# Patient Record
Sex: FEMALE | Race: WHITE | NOT HISPANIC OR LATINO | Employment: UNEMPLOYED | URBAN - METROPOLITAN AREA
[De-identification: names, ages, dates, MRNs, and addresses within clinical notes are randomized per-mention and may not be internally consistent; named-entity substitution may affect disease eponyms.]

---

## 2017-12-28 ENCOUNTER — OFFICE VISIT (OUTPATIENT)
Dept: URGENT CARE | Age: 47
End: 2017-12-28
Payer: COMMERCIAL

## 2017-12-28 PROCEDURE — G0382 LEV 3 HOSP TYPE B ED VISIT: HCPCS | Performed by: FAMILY MEDICINE

## 2017-12-30 NOTE — PROGRESS NOTES
Assessment   1  Viral upper respiratory tract infection with cough (465 9) (J06 9,B97 89)    Plan   Viral upper respiratory tract infection with cough    · Promethazine-DM 6 25-15 MG/5ML Oral Syrup; TAKE 5 ML EVERY 4 TO 6 HOURS    AS NEEDED FOR COUGH    Discussion/Summary   Discussion Summary: This does to appear to be residual influenza  Symptomatic treatment recommended  Push fluids  Rest  Vaporizer in bedroom  Decrease smoking (consider quitting)  Prescription cough medicine as directed  fevers typically last anywhere from 5-7 days and then slowly resolve  Cough and nasal drainage typically max at day 10 and then slowly improved over the next couple weeks  Provider likes the Mucinex D products for daytime use  Typical uses 1/2 to 1 tablet twice a day with full glass of fluid  Then may use cough medicine at bedtime  Chief Complaint   1  Cold Symptoms   2  Fever  Chief Complaint Free Text Note Form: Since 12/22/17 - sore throat, nasal congestion /rhinorrhea and PND, R ear pain dry cough and fever at HS (100 8 max on 12/25/17)  Motrin, DayQuil and Tussin DM  Flu vaccine  History of Present Illness   HPI: 55 yo female with sore throat nasal congestion cough fever chills fatigue malaise  Started on December 22nd  She has also had some right ear pain  She has been doing Motrin as well as DayQuil and Tussin p [facility] Cough is worse at night  Spent Christmas day on the couch  2 sons with similar illness  Hospital Based Practices Required Assessment:      Pain Assessment      the patient states they have pain  The pain is located in the R ear, cough, sinuses and sore throat  (on a scale of 0 to 10, the patient rates the pain at 6 )      Abuse And Domestic Violence Screen       Yes, the patient is safe at home  -- The patient states no one is hurting them  Depression And Suicide Screen  No, the patient has not had thoughts of hurting themself  No, the patient has not felt depressed in the past 7 days  Prefered Language is  [place]  Primary Language is  English  Review of Systems   Focused-Female:      Constitutional: as noted in HPI       ENT: as noted in HPI  Cardiovascular: no complaints of slow or fast heart rate, no chest pain, no palpitations, no leg claudication or lower extremity edema  Respiratory: cough, but-- as noted in HPI  Musculoskeletal: myalgias-- and-- Muscle aches are improving  Integumentary: no complaints of skin rash or lesion, no itching or dry skin, no skin wounds  Neurological: no complaints of headache, no confusion, no numbness or tingling, no dizziness or fainting  Active Problems   1  Cervical strain (847 0) (S16 1XXA)   2  Lumbar strain (847 2) (S39 012A)   3  Neck pain (723 1) (M54 2)   4  Sacroiliac joint dysfunction of right side (724 6) (M53 3)   5  Sciatica (724 3) (M54 30)    Past Medical History   1  History of Back pain (724 5) (M54 9)   2  History of Degenerative disc disease (722 6)  Active Problems And Past Medical History Reviewed: The active problems and past medical history were reviewed and updated today  Family History   Family History Reviewed: The family history was reviewed and updated today  Social History    · Current every day smoker (305 1) (F17 200)   · No alcohol use   · Denied: History of No drug use  Social History Reviewed: The social history was reviewed and updated today  Surgical History   1  History of Cervical Vertebral Fusion   2  History of Oral Surgery Tooth Extraction [place] Tooth   3  History of Tonsillectomy With Adenoidectomy  Surgical History Reviewed: The surgical history was reviewed and updated today  Current Meds    1  Motrin TABS; Therapy: (Recorded:[id number]) to Recorded  Medication List Reviewed: The medication list was reviewed and updated today  Allergies   1  Naprosyn TABS   2   Penicillins    Vitals   Signs   Recorded: [id number] 07:49PM Temperature: 98 3 F, Oral  Heart Rate: 71  Pulse Quality: Regular  Respiration: 18  Systolic: 044, RUE, Sitting  Diastolic: 60, RUE, Sitting  Height: 5 ft 1 25 in  Weight: 122 lb 6 4 oz  BMI Calculated: 22 94  BSA Calculated: 1 54  O2 Saturation: 96  LMP: [id number]  Pain Scale: 6    Physical Exam        Constitutional well-developed well-nourished female in no acute distress but does appear mildly ill  Eyes      Conjunctiva and lids: No swelling, erythema or discharge  Pupils and irises: Equal, round and reactive to light  Ears, Nose, Mouth, and Throat      External inspection of ears and nose: Normal        Otoscopic examination: Abnormal  -- slight retraction right TM without redness or fluid  Nasal mucosa, septum, and turbinates: Abnormal  -- nasal turbinates red and swollen  Oropharynx: Abnormal  -- cobblestoning and patchy redness of the posterior pharynx  Pulmonary      Respiratory effort: No increased work of breathing or signs of respiratory distress  Auscultation of lungs: Clear to auscultation  Cardiovascular      Palpation of heart: Normal PMI, no thrills  Auscultation of heart: Normal rate and rhythm, normal S1 and S2, without murmurs  Lymphatic      Palpation of lymph nodes in neck: No lymphadenopathy  Psychiatric      Orientation to person, place, and time: Normal        Mood and affect: Normal        Signatures    Electronically signed by :  Jason Dunlap [facility]; Dec 28 2017  8:38PM EST                       (Author)     Electronically signed by : Love Puentes DO; Dec 29 2017  7:15AM EST                       (Co-author)

## 2018-01-23 VITALS
HEIGHT: 61 IN | OXYGEN SATURATION: 96 % | SYSTOLIC BLOOD PRESSURE: 102 MMHG | BODY MASS INDEX: 23.11 KG/M2 | WEIGHT: 122.4 LBS | RESPIRATION RATE: 18 BRPM | DIASTOLIC BLOOD PRESSURE: 60 MMHG | TEMPERATURE: 98.3 F | HEART RATE: 71 BPM

## 2018-04-18 ENCOUNTER — OFFICE VISIT (OUTPATIENT)
Dept: URGENT CARE | Age: 48
End: 2018-04-18
Payer: COMMERCIAL

## 2018-04-18 ENCOUNTER — APPOINTMENT (OUTPATIENT)
Dept: RADIOLOGY | Age: 48
End: 2018-04-18
Attending: PHYSICIAN ASSISTANT
Payer: COMMERCIAL

## 2018-04-18 VITALS
HEIGHT: 62 IN | WEIGHT: 120 LBS | RESPIRATION RATE: 18 BRPM | SYSTOLIC BLOOD PRESSURE: 122 MMHG | TEMPERATURE: 98.3 F | DIASTOLIC BLOOD PRESSURE: 60 MMHG | BODY MASS INDEX: 22.08 KG/M2 | HEART RATE: 80 BPM | OXYGEN SATURATION: 98 %

## 2018-04-18 DIAGNOSIS — R06.02 SHORTNESS OF BREATH: Primary | ICD-10-CM

## 2018-04-18 DIAGNOSIS — R07.9 CHEST PAIN, UNSPECIFIED TYPE: ICD-10-CM

## 2018-04-18 DIAGNOSIS — R06.02 SHORTNESS OF BREATH: ICD-10-CM

## 2018-04-18 LAB
ATRIAL RATE: 67 BPM
P AXIS: 73 DEGREES
PR INTERVAL: 146 MS
QRS AXIS: 71 DEGREES
QRSD INTERVAL: 72 MS
QT INTERVAL: 374 MS
QTC INTERVAL: 395 MS
T WAVE AXIS: 57 DEGREES
VENTRICULAR RATE: 67 BPM

## 2018-04-18 PROCEDURE — 93005 ELECTROCARDIOGRAM TRACING: CPT | Performed by: PHYSICIAN ASSISTANT

## 2018-04-18 PROCEDURE — 71046 X-RAY EXAM CHEST 2 VIEWS: CPT

## 2018-04-18 PROCEDURE — 99213 OFFICE O/P EST LOW 20 MIN: CPT | Performed by: FAMILY MEDICINE

## 2018-04-18 PROCEDURE — 93010 ELECTROCARDIOGRAM REPORT: CPT | Performed by: INTERNAL MEDICINE

## 2018-04-18 RX ORDER — BUPRENORPHINE AND NALOXONE 8; 2 MG/1; MG/1
1 FILM, SOLUBLE BUCCAL; SUBLINGUAL DAILY
COMMUNITY

## 2018-04-19 NOTE — PROGRESS NOTES
[facility]        NAME: Óscar Allan is a 53 y o  female  : 1970    MRN: [id number]  DATE: 2018  TIME: 10:07 PM    Assessment and Plan   Shortness of breath [R06 02]  1  Shortness of breath  XR chest pa & lateral   2  Chest pain, unspecified type       EKG:  No acute changes  Chest x-ray:  No acute changes    Patient Instructions     Patient Instructions   No acute abnormalities on chest x-ray or EKG  In light of your symptoms, further evaluation recommended at the emergency room  In light of your current chest pain and shortness of breath, recommend transport by ambulance  Patient refused that and did sign out AMA  She reports that she will go to [facility] on her own  Call center notify the patient will be going to [facility]  Chief Complaint     Chief Complaint   Patient presents with    Chest Pain     x  2days SOB and b/l lower rib chest pain  No cough  Had fever 102 yesterday - today 100 4 - taking Motrin 3 tabs q 4 hours  Notes fast and hard heart beat when supine    Shortness of Breath    Fever         History of Present Illness   Óscar Allan presents to the clinic c/o    59-year-old female that stated that she developed some gradual chest pain about 2 days ago that was in the back in front of her lower chest area with associated shortness of breath  It has gradually increased  She says she has had episodes of rapid heart rate  She feels like she has to take a deep breath off and on  Pain is worse when she lays flat  She has seen her doctor in the past for rapid heart rate and was recommended to see a cardiologist   She has not done that to date  She is a smoker  Review of Systems   Review of Systems   Constitutional: Positive for fatigue  Negative for chills and fever  HENT: Negative  Respiratory: Positive for chest tightness and shortness of breath  Negative for apnea, cough, wheezing and stridor  Cardiovascular: Positive for chest pain and palpitations  Negative for leg swelling  Gastrointestinal: Positive for abdominal pain  Chronic abdominal pain  No recent changes  Genitourinary: Negative  Neurological: Negative  Current Medications     No long-term prescriptions on file  Current Allergies     Allergies as of 04/18/2018 - Reviewed 04/18/2018   Allergen Reaction Noted    Naproxen Abdominal Pain 10/15/2014    Penicillins Diarrhea and Abdominal Pain 11/22/2015            The following portions of the patient's history were reviewed and updated as appropriate: allergies, current medications, past family history, past medical history, past social history, past surgical history and problem list     Objective   /60 (BP Location: Left arm, Patient Position: Sitting, Cuff Size: Standard)   Pulse 80   Temp 98 3 °F (36 8 °C) (Oral)   Resp 18   Ht 5' 1 75" (1 568 m)   Wt 54 4 kg (120 lb)   LMP 03/31/2018 (Approximate)   SpO2 98%   BMI 22 13 kg/m²        Physical Exam     Physical Exam   Constitutional: She is oriented to person, place, and time  She appears well-developed and well-nourished  No distress  HENT:   Head: Normocephalic  Eyes: Conjunctivae are normal  Pupils are equal, round, and reactive to light  Right eye exhibits no discharge  Left eye exhibits no discharge  No scleral icterus  Neck: Normal range of motion  Neck supple  Cardiovascular: Normal rate, regular rhythm, normal heart sounds and intact distal pulses  Exam reveals no gallop and no friction rub  No murmur heard  Pulmonary/Chest: Effort normal and breath sounds normal  No respiratory distress  She has no wheezes  She has no rales  She exhibits no tenderness  Abdominal:   Rounded  Soft  Generalized tenderness to palpation without guarding or rigidity  Musculoskeletal: She exhibits no edema  Lymphadenopathy:     She has no cervical adenopathy     Neurological: She is alert and oriented to person, place, and time  Skin: Skin is warm and dry  No rash noted  She is not diaphoretic  Psychiatric: She has a normal mood and affect  Nursing note and vitals reviewed

## 2018-04-19 NOTE — PATIENT INSTRUCTIONS
No acute abnormalities on chest x-ray or EKG  In light of your symptoms, further evaluation recommended at the emergency room  In light of your current chest pain and shortness of breath, recommend transport by ambulance  Patient refused that and did sign out AMA  She reports that she will go to [facility] on her own  Call center notify the patient will be going to [facility] Emergency Room

## 2018-11-06 ENCOUNTER — TRANSCRIBE ORDERS (OUTPATIENT)
Dept: ADMINISTRATIVE | Facility: HOSPITAL | Age: 48
End: 2018-11-06

## 2018-11-06 DIAGNOSIS — Z12.31 ENCOUNTER FOR SCREENING MAMMOGRAM FOR MALIGNANT NEOPLASM OF BREAST: Primary | ICD-10-CM
